# Patient Record
Sex: FEMALE | URBAN - METROPOLITAN AREA
[De-identification: names, ages, dates, MRNs, and addresses within clinical notes are randomized per-mention and may not be internally consistent; named-entity substitution may affect disease eponyms.]

---

## 2024-05-29 ENCOUNTER — APPOINTMENT (RX ONLY)
Dept: URBAN - METROPOLITAN AREA CLINIC 151 | Facility: CLINIC | Age: 44
Setting detail: DERMATOLOGY
End: 2024-05-29

## 2024-05-29 DIAGNOSIS — L60.1 ONYCHOLYSIS: ICD-10-CM

## 2024-05-29 PROCEDURE — ? DIAGNOSIS COMMENT

## 2024-05-29 PROCEDURE — ? PRESCRIPTION MEDICATION MANAGEMENT

## 2024-05-29 PROCEDURE — 99203 OFFICE O/P NEW LOW 30 MIN: CPT

## 2024-05-29 NOTE — PROCEDURE: DIAGNOSIS COMMENT
Comment: Note onycholysis on 8 out of 10 nails. Recommend thymol 3% in alcohol to be applied with a glass cody 3 times a day. \\n\\nRx was provided to patient, patient will go to [facility]
Detail Level: Zone
Render Risk Assessment In Note?: yes

## 2024-05-29 NOTE — HPI: OTHER
Condition:: Nail issues
Please Describe Your Condition:: Pt reports discoloration on b/l nails for the past 10 years. She previously had nails tested for fungus and came out negative.